# Patient Record
(demographics unavailable — no encounter records)

---

## 2024-12-17 NOTE — ASSESSMENT
[FreeTextEntry1] : 82 year old female followup with acute exacerbation of chronic neck pain.  She denies radicular pain, recent illness, fevers, numbness, weakness, balance problems, saddle anesthesia, urinary retention or fecal incontinence. She has been doing her HEP which has helped her symptoms.  She does report some gradual return of the symptoms.  She will take meloxicam rarely, but it is effective.  She will continue meloxicam prn.  Continue HEP.  The patient was given a referral for physical therapy. F/U 3 months.  Meloxicam prn.  We discussed red flag symptoms that would require emergent evaluation. She knows to call with any questions or concerns or if her symptoms acutely worsen.

## 2024-12-17 NOTE — PHYSICAL EXAM
[de-identified] : General: No acute distress, conversant, well-nourished. Head: Normocephalic, atraumatic Neck: trachea midline, FROM Heart: normotensive and normal rate and rhythm Lungs: No labored breathing Skin: No abrasions, no rashes, no edema Psych: Alert and oriented to person, place and time Extremities: no peripheral edema or digital cyanosis Gait: Normal gait. Can perform tandem gait.   Vascular: warm and well perfused distally, palpable distal pulses MSK: Spine:  No tenderness to palpation.  No step-off, no deformity.  NEURO: Sensation           Left            C5     2/2                C6     2/2                C7     2/2                C8     2/2               T1     2/2                        Right          C5     2/2                C6     2/2                C7     2/2                C8     2/2               T1     2/2        Motor:                                                 Left              C5 (deltoid abduction)             5/5                C6 (biceps flexion)                   5/5                 C7 (triceps extension)             5/5                C8 (finger flexion)                     5/5                T1 (interosseous)                     5/5                                                            Right            C5 (deltoid abduction)             5/5                C6 (biceps flexion)                   5/5                 C7 (triceps extension)             5/5                C8 (finger flexion)                     5/5                T1 (interosseous)                     5/5                       Sensation  Left L2  -  2/2             Left L3  -  2/2 Left L4  -  2/2 Left L5  -  2/2 Left S1  -  2/2  Right L2  -  2/2             Right L3  -  2/2 Right L4  -  2/2 Right L5  -  2/2 Right S1  -  2/2  Motor:  Left L2 (hip flexion)                            5/5                 Left L3 (knee extension)                   5/5                 Left L4 (ankle dorsiflexion)                 5/5                 Left L5 (long toe extensor)                5/5                 Left S1 (ankle plantar flexion)           5/5  Right L2 (hip flexion)                            5/5                 Right L3 (knee extension)                   5/5                 Right L4 (ankle dorsiflexion)                 5/5                 Right L5 (long toe extensor)                5/5                 Right S1 (ankle plantar flexion)           5/5  Reflexes: Normal and symmetric Negative Spurlings test.  Negative Hoffmans reflex.   Negative clonus.  Down-going Babinski. [de-identified] : Cervical 4 view radiographs no dislocation or fracture. Cervical spondylosis.  No instability on dynamic series.  I independently reviewed her cervical MRI which shows degenerative changes including stenosis without compression of the neural elements.

## 2024-12-17 NOTE — HISTORY OF PRESENT ILLNESS
[de-identified] : 82 year old female followup with acute exacerbation of chronic neck pain.  She denies radicular pain, recent illness, fevers, numbness, weakness, balance problems, saddle anesthesia, urinary retention or fecal incontinence. She has been doing her HEP which has helped her symptoms.  She does report some gradual return of the symptoms.  She will take meloxicam rarely, but it is effective.

## 2024-12-17 NOTE — REASON FOR VISIT
[Follow-Up Visit] : a follow-up visit for [Neck Pain] : neck pain [FreeTextEntry2] : Pain level: Work out at home: Helpful

## 2025-01-16 NOTE — HISTORY OF PRESENT ILLNESS
[Snoring] : snoring [Daytime Somnolence] : daytime somnolence [FreeTextEntry1] : 81YO Female with neck pain, HTN, HLD, pre-diabetes, hypothyroidism, and a hx of Moderate WOLFGANG who presents for initial evaluation.  She was diagnosed with WOLFGANG at [place] 12/22/2023 showing moderate WOLFGANG only seen in supine. During the study she was noted to have multifocal ectopy on her ECG. She reports she did not follow up.  She states that she was in [place] in 20223 and reports that she developed insomnia. She reported dry mouth. She reports nocturnal gasping for air.   Sleep routine: 11-6 Latency: Quickly Night time awakenings: a few times sometimes gasping for air  Witnessed apneas: Denies Snoring: Reports Headaches: At times Dry mouth: Reports Sleep quality: Sometimes refreshed ESS: 12 [ESS] : 12

## 2025-01-16 NOTE — REVIEW OF SYSTEMS
[EDS: ESS=____] : daytime somnolence: ESS=[unfilled] [Shortness Of Breath] : no shortness of breath [Obesity] : not obese [Difficulty Initiating Sleep] : no difficulty falling asleep [Difficulty Maintaining Sleep] : no difficulty maintaining sleep

## 2025-01-16 NOTE — PHYSICAL EXAM
[II] : II [Heart Rate And Rhythm] : heart rate was normal and rhythm regular [Heart Sounds] : normal S1 and S2 [] : no respiratory distress [Respiration, Rhythm And Depth] : normal respiratory rhythm and effort [Exaggerated Use Of Accessory Muscles For Inspiration] : no accessory muscle use [Auscultation Breath Sounds / Voice Sounds] : lungs were clear to auscultation bilaterally [Abnormal Walk] : normal gait [Oriented To Time, Place, And Person] : oriented to person, place, and time [General Appearance - Well Developed] : well developed [General Appearance - Well Nourished] : well nourished [Neck Appearance] : the appearance of the neck was normal

## 2025-01-16 NOTE — ASSESSMENT
[FreeTextEntry1] : REVIEWED PSG ([place]) 12/22/2023 AHI 19/hr, supine 32/hr, non-supine positions without respiratory events  83YO Female with neck pain and a hx of Moderate WOLFGANG who presents for initial evaluation.  #WOLFGANG - She is reporting nonrestorative sleep and excessive daytime sleepiness - She is interested in pursuing definitive therapy with PAP as positional therapy has not been effective - Given her test is from 2023 will need to repeat HST - Explained physiology and ramifications of WOLFGANG and that depending on diagnosis treatment may involve PAP therapy vs other modalities which pt is amenable to - Will obtain WatchPAT HST  RTC after HST

## 2025-01-16 NOTE — ASSESSMENT
[FreeTextEntry1] : REVIEWED PSG ([place]) 12/22/2023 AHI 19/hr, supine 32/hr, non-supine positions without respiratory events  83YO Female with neck pain and a hx of Moderate WOLFGANG who presents for initial evaluation.  #WOLFGANG - She is reporting nonrestorative sleep and excessive daytime sleepiness - She is interested in pursuing definitive therapy with PAP as positional therapy has not been effective - Given her test is from 2023 will need to repeat HST - Explained physiology and ramifications of WOLFGANG and that depending on diagnosis treatment may involve PAP therapy vs other modalities which pt is amenable to - Will obtain WatchPAT HST  RTC after HST

## 2025-01-16 NOTE — HISTORY OF PRESENT ILLNESS
[Snoring] : snoring [Daytime Somnolence] : daytime somnolence [FreeTextEntry1] : 81YO Female with neck pain, HTN, HLD, pre-diabetes, hypothyroidism, and a hx of Moderate WOLFGANG who presents for initial evaluation.  She was diagnosed with WOLFGANG at [facility] 12/22/2023 showing moderate WOLFGANG only seen in supine. During the study she was noted to have multifocal ectopy on her ECG. She reports she did not follow up.  She states that she was in [place] in 20223 and reports that she developed insomnia. She reported dry mouth. She reports nocturnal gasping for air.   Sleep routine: 11-6 Latency: Quickly Night time awakenings: a few times sometimes gasping for air  Witnessed apneas: Denies Snoring: Reports Headaches: At times Dry mouth: Reports Sleep quality: Sometimes refreshed ESS: 12 [ESS] : 12

## 2025-03-06 NOTE — PHYSICAL EXAM
[de-identified] : Right knee  Constitutional:  The patient is healthy-appearing and in no apparent distress.   Gait: The patient ambulates with a normal gait and no limp.  Cardiovascular System:  The capillary refill is less than 2 seconds.   Skin:  There are no skin abnormalities.  Right Knee:   Bony Palpation:  There is tenderness of the medial joint line.  There is no tenderness of the lateral joint line. There is tenderness of the medial femoral chondyle. There is no tenderness of the lateral femoral chondyle. There is no tenderness of the tibial tubercle. There is no tenderness of the superior patella. There is no tenderness of the inferior patella. There is tenderness of the medial patellar facet. There is tenderness of the lateral patellar facet.  Soft Tissue Palpation:  There is no tenderness of the medial retinaculum. There is no tenderness of the lateral retinaculum. There is no tenderness of the quadriceps tendon. There is no tenderness of the patella tendon. There is no tenderness of the ITB. There is no tenderness of the pes anserine.  Active Range of Motion:  The range of motion at the knee actively and passively is full.   Special Tests:  There is a negative Apley. There is a negative Steinmanns.  There is a negative Lachman and Anterior Drawer. There is a negative Posterior Drawer.   There is no varus or valgus laxity.  Strength:  There is 5/5 hip flexion and 5/5 knee flexion and extension.    Psychiatric:  The patient demonstrates a normal mood and affect and is active and alert   [de-identified] : X-based on detailed discussion with history and physical examination of the patient, x-ray evaluation is recommended and ordered for treatment and diagnosis. X-ray right knee 3 view: There is severe tricompartmental arthritis

## 2025-03-06 NOTE — HISTORY OF PRESENT ILLNESS
[de-identified] : Initial visit: Right knee Pain  Reason: Bakers Cyst / Osteoarthritis Duration: 12/2024 Prior studies: x rays - access patient portal  Symptoms Swelling / Stiffness / Bending  Aggravating Fx: squatting / sit to stand  Alleviating Fx: Pain level: 6/10 Pain medication: Medical Hx: Bakers Cyst - 2017 - Drained  Surgical Hx: Current Medication: Amlodipine / Lipitor / Meloxicam  Allergies: Statins  Physical therapy - Currently

## 2025-03-06 NOTE — ASSESSMENT
[FreeTextEntry1] : Discussed at length with patient severity of arthritis and at this time she elects observation home exercises and no improvement she will consider follow-up in the office she is aware that ultimately she may benefit total knee replacement

## 2025-03-06 NOTE — HISTORY OF PRESENT ILLNESS
[de-identified] : Initial visit: Right knee Pain  Reason: Bakers Cyst / Osteoarthritis Duration: 12/2024 Prior studies: x rays - access patient portal  Symptoms Swelling / Stiffness / Bending  Aggravating Fx: squatting / sit to stand  Alleviating Fx: Pain level: 6/10 Pain medication: Medical Hx: Bakers Cyst - 2017 - Drained  Surgical Hx: Current Medication: Amlodipine / Lipitor / Meloxicam  Allergies: Statins  Physical therapy - Currently

## 2025-03-06 NOTE — PHYSICAL EXAM
[de-identified] : Right knee  Constitutional:  The patient is healthy-appearing and in no apparent distress.   Gait: The patient ambulates with a normal gait and no limp.  Cardiovascular System:  The capillary refill is less than 2 seconds.   Skin:  There are no skin abnormalities.  Right Knee:   Bony Palpation:  There is tenderness of the medial joint line.  There is no tenderness of the lateral joint line. There is tenderness of the medial femoral chondyle. There is no tenderness of the lateral femoral chondyle. There is no tenderness of the tibial tubercle. There is no tenderness of the superior patella. There is no tenderness of the inferior patella. There is tenderness of the medial patellar facet. There is tenderness of the lateral patellar facet.  Soft Tissue Palpation:  There is no tenderness of the medial retinaculum. There is no tenderness of the lateral retinaculum. There is no tenderness of the quadriceps tendon. There is no tenderness of the patella tendon. There is no tenderness of the ITB. There is no tenderness of the pes anserine.  Active Range of Motion:  The range of motion at the knee actively and passively is full.   Special Tests:  There is a negative Apley. There is a negative Steinmanns.  There is a negative Lachman and Anterior Drawer. There is a negative Posterior Drawer.   There is no varus or valgus laxity.  Strength:  There is 5/5 hip flexion and 5/5 knee flexion and extension.    Psychiatric:  The patient demonstrates a normal mood and affect and is active and alert   [de-identified] : X-based on detailed discussion with history and physical examination of the patient, x-ray evaluation is recommended and ordered for treatment and diagnosis. X-ray right knee 3 view: There is severe tricompartmental arthritis

## 2025-03-07 NOTE — HISTORY OF PRESENT ILLNESS
[Home] : at home, [unfilled] , at the time of the visit. [Medical Office: ([facility])___] : at the medical office located in  [This encounter was initiated by telehealth (audio with video) and converted to telephone (audio only)] : This encounter was initiated by telehealth (audio with video) and converted to telephone (audio only) [Technical] : patient unable to effectively utilize tele-video due to technical issues. [Verbal consent obtained from patient] : the patient, [unfilled] [FreeTextEntry1] : 83YO Female with neck pain, HTN, HLD, pre-diabetes, hypothyroidism, and a hx of Moderate WOLFGANG who presents for initial evaluation.  She was diagnosed with WOLFGANG at [place] 12/22/2023 showing moderate WOLFGANG only seen in supine. During the study she was noted to have multifocal ectopy on her ECG. She reports she did not follow up. She states that she was in [place] in 20223 and reports that she developed insomnia. She reported dry mouth. She reports nocturnal gasping for air.  Sleep routine: 11-6 Latency: Quickly Night time awakenings: a few times sometimes gasping for air Witnessed apneas: Denies Snoring: Reports Headaches: At times Dry mouth: Reports Sleep quality: Sometimes refreshed  3/7/2025 Had HST and here to discuss results.  [ESS] : 12

## 2025-03-07 NOTE — ASSESSMENT
[FreeTextEntry1] : REVIEWED PSG ([place]) 12/22/2023 AHI 19/hr, supine 32/hr, non-supine positions without respiratory events HST 2/20/2025: AHI 11.7 (3%); AHI 5.1 (4%); t88 0.1 minutes  84YO Female with neck pain and a hx of Moderate WOLFGANG who presents for initial evaluation.  #WOLFGANG - She is reporting nonrestorative sleep and excessive daytime sleepiness. Has positional therapy and it was not effective.  - Previous PSG in 2023 shows moderate WOLFGANG. Current HST shows mild WOLFGANG. She lost weight between studies.   - According to current practice guidelines treatment for mild WOLFGANG is only indicated for symptom management. The benefits of WOLFGANG treatment in improving cardiac, neurologic, cognitive, and mortality outcomes have not been substantiated by research when mild in severity. PAP, MAD, and Excite WOLFGANG discussed.  - She would like to pursue PAP therapy. Equipment ordered; DME is CS; will have mask fitting at time of ; APAP set 5 to 20 cm H2O. Adherence goal is at least 4 hours of use per night on 70% of nights with an AHI of less than 10 events per hour. The ramifications of WOLFGANG and its treatment were discussed in detail.   Follow up 10 weeks after starting PAP or sooner if needed.

## 2025-03-07 NOTE — PHYSICAL EXAM
[FreeTextEntry1] : There was capability for an audio-visual visit but the patient did not have access to a visual platform and/or did not consent to a visual component for the visit. An audio visit only was conducted.

## 2025-03-25 NOTE — PHYSICAL EXAM
[de-identified] : General: No acute distress, conversant, well-nourished. Head: Normocephalic, atraumatic Neck: trachea midline, FROM Heart: normotensive and normal rate and rhythm Lungs: No labored breathing Skin: No abrasions, no rashes, no edema Psych: Alert and oriented to person, place and time Extremities: no peripheral edema or digital cyanosis Gait: Normal gait. Can perform tandem gait. Vascular: warm and well perfused distally, palpable distal pulses MSK: Spine: No tenderness to palpation. No step-off, no deformity.  NEURO: Sensation  Left  C5 2/2  C6 2/2  C7 2/2  C8 2/2  T1 2/2    Right  C5 2/2  C6 2/2  C7 2/2  C8 2/2  T1 2/2  Motor:       Left  C5 (deltoid abduction)  5/5  C6 (biceps flexion)   5/5  C7 (triceps extension)  5/5  C8 (finger flexion)   5/5  T1 (interosseous)   5/5         Right  C5 (deltoid abduction)  5/5  C6 (biceps flexion)   5/5  C7 (triceps extension)  5/5  C8 (finger flexion)   5/5  T1 (interosseous)   5/5    Sensation Left L2 - 2/2  Left L3 - 2/2 Left L4 - 2/2 Left L5 - 2/2 Left S1 - 2/2  Right L2 - 2/2  Right L3 - 2/2 Right L4 - 2/2 Right L5 - 2/2 Right S1 - 2/2  Motor: Left L2 (hip flexion)    5/5  Left L3 (knee extension)   5/5  Left L4 (ankle dorsiflexion)   5/5  Left L5 (long toe extensor)  5/5  Left S1 (ankle plantar flexion)  5/5  Right L2 (hip flexion)    5/5  Right L3 (knee extension)   5/5  Right L4 (ankle dorsiflexion)   5/5  Right L5 (long toe extensor)  5/5  Right S1 (ankle plantar flexion)  5/5  Reflexes: Normal and symmetric Negative Spurlings test. Negative Hoffmans reflex. Negative clonus. Down-going Babinski. [de-identified] : Cervical 4 view radiographs no dislocation or fracture. Cervical spondylosis.  No instability on dynamic series.  I independently reviewed her cervical MRI which shows degenerative changes including stenosis without compression of the neural elements.

## 2025-03-25 NOTE — HISTORY OF PRESENT ILLNESS
[de-identified] : 83 year old female followup with acute exacerbation of chronic neck pain. She denies radicular pain, recent illness, fevers, numbness, weakness, balance problems, saddle anesthesia, urinary retention or fecal incontinence. Since previous visit, patient has been doing HEP for her neck which helps. She takes aspirin occasionally for the pain with relief. She is seeing Dr. Rodriguez for her knees. She is planning to travel to [facility] at the end of April.

## 2025-03-25 NOTE — ASSESSMENT
[FreeTextEntry1] : 83 year old female followup with acute exacerbation of chronic neck pain. She denies radicular pain, recent illness, fevers, numbness, weakness, balance problems, saddle anesthesia, urinary retention or fecal incontinence. Since previous visit, patient has been doing HEP for her neck which helps. She takes aspirin occasionally for the pain with relief. She is seeing Dr. Rodriguez for her knees. She is planning to travel to [facility] at the end of April. The patient was given a referral for physical therapy. Continue HEP. Aspirin PRN. F/U with Dr. Rodriguez for knees. F/U in 6-8 weeks. We discussed red flag symptoms that would require emergent evaluation. She knows to call with any questions or concerns or if her symptoms acutely worsen.

## 2025-03-25 NOTE — REASON FOR VISIT
[Follow-Up Visit] : a follow-up visit for [Neck Pain] : neck pain [FreeTextEntry2] : pain level 2/10, needs new physical therapy referral

## 2025-03-25 NOTE — DISCUSSION/SUMMARY
[de-identified] :  I, Dr. Mohan personally performed the evaluation and management services for this established patient who presents today with (a) new problem(s)/exacerbation of (an) existing condition(s). That E/M includes conducting the clinically appropriate interval history &/or exam, assessing all new/exacerbated conditions, and establishing a new plan of care. Today, my TIFFANY, Milka Pickens was here to observe my evaluation and management service for this new problem/exacerbated condition and follow the plan of care established by me going forward.

## 2025-05-07 NOTE — PHYSICAL EXAM
[de-identified] : General: No acute distress, conversant, well-nourished. Head: Normocephalic, atraumatic Neck: trachea midline, FROM Heart: normotensive and normal rate and rhythm Lungs: No labored breathing Skin: No abrasions, no rashes, no edema Psych: Alert and oriented to person, place and time Extremities: no peripheral edema or digital cyanosis Gait: Normal gait. Can perform tandem gait.   Vascular: warm and well perfused distally, palpable distal pulses MSK: Bilateral Knee with no erythema, no effusion.   No tenderness to palpation of knee. No medial joint line tenderness. No lateral joint line tenderness.  Range of motion: 0 - 130 degrees No pain with range of motion.  Negative Yanni's test. Stable to varus and valgus stress. Negative Lachman's test, negative anterior and posterior drawer tests.    Sensation intact to light touch.   Normal motor exam. Warm and well perfused distally. Spine:  No tenderness to palpation.  No step-off, no deformity.  NEURO: Sensation           Left            C5     2/2                C6     2/2                C7     2/2                C8     2/2               T1     2/2                        Right          C5     2/2                C6     2/2                C7     2/2                C8     2/2               T1     2/2        Motor:                                                 Left              C5 (deltoid abduction)             5/5                C6 (biceps flexion)                   5/5                 C7 (triceps extension)             5/5                C8 (finger flexion)                     5/5                T1 (interosseous)                     5/5                                                            Right            C5 (deltoid abduction)             5/5                C6 (biceps flexion)                   5/5                 C7 (triceps extension)             5/5                C8 (finger flexion)                     5/5                T1 (interosseous)                     5/5                       Sensation  Left L2  -  2/2             Left L3  -  2/2 Left L4  -  2/2 Left L5  -  2/2 Left S1  -  2/2  Right L2  -  2/2             Right L3  -  2/2 Right L4  -  2/2 Right L5  -  2/2 Right S1  -  2/2  Motor:  Left L2 (hip flexion)                            5/5                 Left L3 (knee extension)                   5/5                 Left L4 (ankle dorsiflexion)                 5/5                 Left L5 (long toe extensor)                5/5                 Left S1 (ankle plantar flexion)           5/5  Right L2 (hip flexion)                            5/5                 Right L3 (knee extension)                   5/5                 Right L4 (ankle dorsiflexion)                 5/5                 Right L5 (long toe extensor)                5/5                 Right S1 (ankle plantar flexion)           5/5  Reflexes: Normal and symmetric Negative Spurlings test.  Negative Hoffmans reflex.   Negative clonus.  Down-going Babinski. [de-identified] : Cervical 4 view radiographs no dislocation or fracture. Cervical spondylosis.  No instability on dynamic series.  I independently reviewed her cervical MRI which shows degenerative changes including stenosis without compression of the neural elements.

## 2025-05-07 NOTE — ASSESSMENT
[FreeTextEntry1] : 83 year old female followup with acute exacerbation of chronic neck pain.  She denies radicular pain, recent illness, fevers, numbness, weakness, balance problems, saddle anesthesia, urinary retention or fecal incontinence. She also has bilateral knee pain. She recently returned from a trip to [place] [facility].  She said he neck bothered her on the flight. The patient was given a referral for physical therapy.  She will return to PT at [facility]. Meloxicam prn. F/U 3-6 months. We discussed red flag symptoms that would require emergent evaluation. She knows to call with any questions or concerns or if her symptoms acutely worsen.

## 2025-05-07 NOTE — REASON FOR VISIT
[Follow-Up Visit] : a follow-up visit for [Neck Pain] : neck pain [Other: ____] : [unfilled] [FreeTextEntry2] : pt would like physical therapy referral neck/right knee pain, pain level 5/10

## 2025-05-15 NOTE — ASSESSMENT
[FreeTextEntry1] : REVIEWED PSG ([place]) 12/22/2023 AHI 19/hr, supine 32/hr, non-supine positions without respiratory events HST 2/20/2025: AHI 11.7 (3%); AHI 5.1 (4%); t88 0.1 minutes  84YO Female with neck pain and a hx of moderate WOLFGANG improved to mild WOLFGANG on recent HST, who presents for follow-up.  #WOLFGANG - Previous PSG in 2023 shows moderate WOLFGANG. Current HST shows mild WOLFGANG. May have lost small amount of weight between studies. Also employs positional therapy. - According to current practice guidelines treatment for mild WOLFGANG is only indicated for symptom management. The benefits of WOLFGANG treatment in improving cardiac, neurologic, cognitive, and mortality outcomes have not been substantiated by research when mild in severity.  - Trialed CPAP for non-restorative sleep and EDS.  Did get some benefit from PAP therapy but found using the mask to exacerbate her chronic neck pain. Sleep quality has been better recently.  - Discussed MAD, Excite as well.  - She will hold off on tx of mild WOLFGANG at present given improvements in sleep quality.  - Advised to reach out if any clinical changes and can re-address treatment at that time.   Follow up as needed.

## 2025-05-15 NOTE — HISTORY OF PRESENT ILLNESS
[FreeTextEntry1] : 81YO Female with neck pain, HTN, HLD, pre-diabetes, hypothyroidism, and a hx of Moderate WOLFGANG who presents for initial evaluation.  She was diagnosed with WOLFGANG at [facility] [place] 12/22/2023 showing moderate WOLFGANG only seen in supine. During the study she was noted to have multifocal ectopy on her ECG. She reports she did not follow up. She states that she was in [place] in [address]223 and reports that she developed insomnia. She reported dry mouth. She reports nocturnal gasping for air.  Sleep routine: 11-6 Latency: Quickly Night time awakenings: a few times sometimes gasping for air Witnessed apneas: Denies Snoring: Reports Headaches: At times Dry mouth: Reports Sleep quality: Sometimes refreshed  3/7/2025 Had HST and here to discuss results.  5/14/2025 Started PAP, has some possible benefit from use in terms of sleep quality but finds the mask exacerbates chronic neck pain. Sleep quality has been better recently. [ESS] : 3

## 2025-05-15 NOTE — HISTORY OF PRESENT ILLNESS
[FreeTextEntry1] : 83YO Female with neck pain, HTN, HLD, pre-diabetes, hypothyroidism, and a hx of Moderate WOLFGANG who presents for initial evaluation.  She was diagnosed with WOLFGANG at [place] 12/22/2023 showing moderate WOLFGANG only seen in supine. During the study she was noted to have multifocal ectopy on her ECG. She reports she did not follow up. She states that she was in [place] in [address]223 and reports that she developed insomnia. She reported dry mouth. She reports nocturnal gasping for air.  Sleep routine: 11-6 Latency: Quickly Night time awakenings: a few times sometimes gasping for air Witnessed apneas: Denies Snoring: Reports Headaches: At times Dry mouth: Reports Sleep quality: Sometimes refreshed  3/7/2025 Had HST and here to discuss results.  5/14/2025 Started PAP, has some possible benefit from use in terms of sleep quality but finds the mask exacerbates chronic neck pain. Sleep quality has been better recently. [ESS] : 3

## 2025-05-15 NOTE — ASSESSMENT
[FreeTextEntry1] : REVIEWED PSG ([place]) 12/22/2023 AHI 19/hr, supine 32/hr, non-supine positions without respiratory events HST 2/20/2025: AHI 11.7 (3%); AHI 5.1 (4%); t88 0.1 minutes  82YO Female with neck pain and a hx of moderate WOLFGANG improved to mild WOLFGANG on recent HST, who presents for follow-up.  #WOLFGANG - Previous PSG in 2023 shows moderate WOLFGANG. Current HST shows mild WOLFGANG. May have lost small amount of weight between studies. Also employs positional therapy. - According to current practice guidelines treatment for mild WOLFGANG is only indicated for symptom management. The benefits of WOLFGANG treatment in improving cardiac, neurologic, cognitive, and mortality outcomes have not been substantiated by research when mild in severity.  - Trialed CPAP for non-restorative sleep and EDS.  Did get some benefit from PAP therapy but found using the mask to exacerbate her chronic neck pain. Sleep quality has been better recently.  - Discussed MAD, [employer] as well.  - She will hold off on tx of mild WOLFGANG at present given improvements in sleep quality.  - Advised to reach out if any clinical changes and can re-address treatment at that time.   Follow up as needed.

## 2025-05-15 NOTE — END OF VISIT
[FreeTextEntry3] :   I, Chayo Pepe MD, personally performed the evaluation and management (E/M) services for this patient. That E/M includes conducting the clinically appropriate initial history &/or exam, assessing all conditions, and establishing the plan of care. I have also independently reviewed the medical records and imaging at the time of this office visit, and discussed the above mentioned images with interpretations with the patient. Today, YURI Guajardo was here to participate in the visit & follow plan of care established by me.

## 2025-07-08 NOTE — REASON FOR VISIT
[Follow-Up Visit] : a follow-up visit for [Neck Pain] : neck pain [Other: ____] : [unfilled] [FreeTextEntry2] : pain level 3/10